# Patient Record
Sex: FEMALE | Race: WHITE | NOT HISPANIC OR LATINO
[De-identification: names, ages, dates, MRNs, and addresses within clinical notes are randomized per-mention and may not be internally consistent; named-entity substitution may affect disease eponyms.]

---

## 2023-05-03 PROBLEM — Z00.00 ENCOUNTER FOR PREVENTIVE HEALTH EXAMINATION: Status: ACTIVE | Noted: 2023-05-03

## 2023-05-10 ENCOUNTER — NON-APPOINTMENT (OUTPATIENT)
Age: 53
End: 2023-05-10

## 2023-05-10 ENCOUNTER — APPOINTMENT (OUTPATIENT)
Dept: OBGYN | Facility: CLINIC | Age: 53
End: 2023-05-10
Payer: COMMERCIAL

## 2023-05-10 VITALS — SYSTOLIC BLOOD PRESSURE: 157 MMHG | HEIGHT: 65 IN | DIASTOLIC BLOOD PRESSURE: 85 MMHG

## 2023-05-10 DIAGNOSIS — Z11.3 ENCOUNTER FOR SCREENING FOR INFECTIONS WITH A PREDOMINANTLY SEXUAL MODE OF TRANSMISSION: ICD-10-CM

## 2023-05-10 DIAGNOSIS — Z12.39 ENCOUNTER FOR OTHER SCREENING FOR MALIGNANT NEOPLASM OF BREAST: ICD-10-CM

## 2023-05-10 DIAGNOSIS — Z01.419 ENCOUNTER FOR GYNECOLOGICAL EXAMINATION (GENERAL) (ROUTINE) W/OUT ABNORMAL FINDINGS: ICD-10-CM

## 2023-05-10 DIAGNOSIS — Z11.51 ENCOUNTER FOR SCREENING FOR HUMAN PAPILLOMAVIRUS (HPV): ICD-10-CM

## 2023-05-10 DIAGNOSIS — Z13.820 ENCOUNTER FOR SCREENING FOR OSTEOPOROSIS: ICD-10-CM

## 2023-05-10 PROCEDURE — 99396 PREV VISIT EST AGE 40-64: CPT

## 2023-05-10 NOTE — REVIEW OF SYSTEMS
[Patient Intake Form Reviewed] : Patient intake form was reviewed [Negative] : Endocrine [de-identified] : History of DVT on Eliquis

## 2023-05-10 NOTE — PLAN
[FreeTextEntry1] : Pap smear completed patient with history of abdominal pelvic pain will have transvaginal sonogram and I have given her referral to Dr. Zen Tapia, GI doctor for evaluation of chronic mid abdominal pain.  She will continue vitamin D3 she will be sent for bone density study.  Patient will return in 12 months for her history of DVT and obesity patient has been advised and discussed weight loss options.  Turn in 12 months

## 2023-05-10 NOTE — HISTORY OF PRESENT ILLNESS
[HIV test declined] : HIV test declined [Syphilis test declined] : Syphilis test declined [Gonorrhea test declined] : Gonorrhea test declined [Chlamydia test declined] : Chlamydia test declined [Trichomonas test declined] : Trichomonas test declined [HPV test declined] : HPV test declined [Hepatitis B test declined] : Hepatitis B test declined [Hepatitis C test declined] : Hepatitis C test declined [postmenopausal] : postmenopausal [N] : Patient does not use contraception [Y] : Positive pregnancy history [N/A] : pregnancy not applicable [Currently Active] : currently active [Men] : men [Vaginal] : vaginal [No] : No [TextBox_4] : PT HERE FOR ANNUAL EXAM\par LMP:5/2020 [Mammogramdate] : 5/4/2023 [TextBox_19] : BR1 [PapSmeardate] : 10/26/21 [TextBox_31] : WNL [BoneDensityDate] : NEVER [ColonoscopyDate] : NEVER [LMPDate] : 5/2020 [PGHxTotal] : 2 [[facility] : [facility] [[facility]Living] : 2 [TextBox_6] : 5/2020 [FreeTextEntry1] : 5/2020

## 2023-05-10 NOTE — PHYSICAL EXAM
[Chaperone Present] : A chaperone was present in the examining room during all aspects of the physical examination [Appropriately responsive] : appropriately responsive [Alert] : alert [No Acute Distress] : no acute distress [No Lymphadenopathy] : no lymphadenopathy [Regular Rate Rhythm] : regular rate rhythm [No Murmurs] : no murmurs [Clear to Auscultation B/L] : clear to auscultation bilaterally [Soft] : soft [Non-tender] : non-tender [Non-distended] : non-distended [No HSM] : No HSM [No Lesions] : no lesions [No Mass] : no mass [Oriented x3] : oriented x3 [Examination Of The Breasts] : a normal appearance [No Masses] : no breast masses were palpable [Labia Majora] : normal [Labia Minora] : normal [Dry Mucosa] : dry mucosa [No Bleeding] : There was no active vaginal bleeding [Normal] : normal [Anteversion] : anteverted [Uterine Adnexae] : normal [Declined] : Patient declined rectal exam [FreeTextEntry1] : SM [FreeTextEntry8] : No masses nontender bilateral difficult secondary to body habitus

## 2023-05-12 LAB
C TRACH RRNA SPEC QL NAA+PROBE: NOT DETECTED
HPV HIGH+LOW RISK DNA PNL CVX: NOT DETECTED
N GONORRHOEA RRNA SPEC QL NAA+PROBE: NOT DETECTED
SOURCE TP AMPLIFICATION: NORMAL

## 2023-05-15 LAB — CYTOLOGY CVX/VAG DOC THIN PREP: NORMAL

## 2023-05-31 ENCOUNTER — APPOINTMENT (OUTPATIENT)
Dept: OBGYN | Facility: CLINIC | Age: 53
End: 2023-05-31
Payer: COMMERCIAL

## 2023-05-31 ENCOUNTER — ASOB RESULT (OUTPATIENT)
Age: 53
End: 2023-05-31

## 2023-05-31 DIAGNOSIS — G89.29 PELVIC AND PERINEAL PAIN: ICD-10-CM

## 2023-05-31 DIAGNOSIS — R10.2 PELVIC AND PERINEAL PAIN: ICD-10-CM

## 2023-05-31 PROCEDURE — 76830 TRANSVAGINAL US NON-OB: CPT

## 2023-05-31 PROCEDURE — 93976 VASCULAR STUDY: CPT

## 2023-05-31 PROCEDURE — 99213 OFFICE O/P EST LOW 20 MIN: CPT

## 2023-05-31 NOTE — HISTORY OF PRESENT ILLNESS
[FreeTextEntry1] : 51 y/o female here for consult to review tvs.  Patient with pelvic discomfort on physical examination transvaginal sonogram reveals normal pelvis small anechoic right ovarian cyst approximately 1 cm with normal Doppler studies with benign appearance..  Patient now states that pain has resolved she will be followed up with GI for further evaluation and cardiology.  Patient will return in 1 year to repeat TVS and annual exam [TextBox_4] : CONSULT TO REVEIW TVS\par LMP:2020

## 2023-06-01 ENCOUNTER — APPOINTMENT (OUTPATIENT)
Dept: ORTHOPEDIC SURGERY | Facility: CLINIC | Age: 53
End: 2023-06-01
Payer: COMMERCIAL

## 2023-06-01 VITALS — WEIGHT: 293 LBS | HEIGHT: 66 IN | BODY MASS INDEX: 47.09 KG/M2

## 2023-06-01 DIAGNOSIS — M75.02 ADHESIVE CAPSULITIS OF LEFT SHOULDER: ICD-10-CM

## 2023-06-01 DIAGNOSIS — M54.12 RADICULOPATHY, CERVICAL REGION: ICD-10-CM

## 2023-06-01 DIAGNOSIS — M75.82 OTHER SHOULDER LESIONS, LEFT SHOULDER: ICD-10-CM

## 2023-06-01 PROCEDURE — 99204 OFFICE O/P NEW MOD 45 MIN: CPT

## 2023-06-01 PROCEDURE — 73030 X-RAY EXAM OF SHOULDER: CPT | Mod: LT

## 2023-06-01 PROCEDURE — 73010 X-RAY EXAM OF SHOULDER BLADE: CPT | Mod: LT

## 2023-06-01 PROCEDURE — 72040 X-RAY EXAM NECK SPINE 2-3 VW: CPT

## 2023-06-02 PROBLEM — M75.82 TENDINITIS OF LEFT ROTATOR CUFF: Status: ACTIVE | Noted: 2023-06-01

## 2023-06-02 NOTE — HISTORY OF PRESENT ILLNESS
[de-identified] : The patient is a 52 year  old L hand dominant female who presents today complaining of L shoulder pain.  \par Date of Injury/Onset: ~04/23\par Pain:    At Rest: 0/10 \par With Activity:  10/10 \par Mechanism of injury: Insidious\par This is not a Work Related Injury being treated under Worker's Compensation.\par This is not an athletic injury occurring associated with an interscholastic or organized sports team.\par Quality of symptoms: radiating, aching, weakness\par Improves with: ice, tylenol \par Worse with: laying at night\par Prior treatment: ice, tylenol\par Prior Imaging: N/A\par Out of work/sport: _, since _\par School/Sport/Position/Occupation: \par Additional Information: None\par

## 2023-06-02 NOTE — IMAGING
[Left] : left shoulder [There are no fractures, subluxations or dislocations. No significant abnormalities are seen] : There are no fractures, subluxations or dislocations. No significant abnormalities are seen [de-identified] : The patient is a well appearing 52 year  old female of their stated age. \par Neck is supple & nontender to palpation. Negative Spurling's test. \par \par Effected Shoulder: LEFT\par Inspection: \par Scapula Winging: Negative \par Deformity: None \par Erythema: None \par Ecchymosis: None \par Abrasions: None \par Effusion: None \par \par Range of Motion:\par Active Forward Flexion: 140 degrees  \par Active Abduction: 140 degrees  \par Passive Forward Flexion: 140 degrees  \par Passive Abduction: 140 degrees  \par ER @ 90 degrees: 75 degrees \par IR @ 90 degrees: 0 degrees \par ER @ 0 degrees: 20 degrees \par Motor Exam: \par Forward Flexion: 4+ out of 5 \par Flexion Plane of Scapula: 4+ out of 5 \par Abduction: 4 out of 5 \par Internal Rotation: 5 out of 5 \par External Rotation: 4 out of 5 \par Distal Motor Strength: 5 out of 5 \par \par Stability Testing: \par Anterior: 1+ \par Posterior: 1+ \par Sulcus N: 1+ \par Sulcus ER: 1+ \par Provocative Tests: \par Drop Arm: Negative \par Impingement: POSITIVE \par [place]: Negative \par X-Arm Adduction: Negative \par Belly Press: Negative \par Bear Hug: Negative \par Lift Off: Negative \par Apprehension: Negative \par Relocation: Negative \par Posterior Load & Shift: Negative \par \par Palpation: \par AC Joint: Nontender \par Clavicle: Nontender \par SC Joint: Nontender \par Bicepital Groove: Nontender \par Coracoid Process: Nontender \par Pectoralis Minor Tendon: Nontender \par Pectoralis Major Tendon: Nontender & palpably intact \par Latissimus Dorsi: Nontender  \par Proximal Humerus: Nontender \par Scapula Body: Nontender \par Medial Scapula Boarder: Nontender \par Scapula Spine: Nontender \par \par Neurologic Exam: Sensation to Light Touch: \par Axillary: Grossly intact \par Ulnar: Grossly intact \par Radial: Grossly intact \par Median: Grossly intact \par Other:  N/A \par Circulatory/Pulses: \par Ulnar: 2+ \par Radial: 2+ \par Other Pertinent Findings: None \par \par Contralateral Shoulder \par Range of Motion: \par Active Forward Flexion: 180 degrees  \par Active Abduction: 180 degrees  \par Passive Forward Flexion: 180 degrees \par Passive Abduction: 180 degrees  \par ER @ 90 degrees: 90 degrees \par IR @ 90 degrees: 45 degrees \par ER @ 0 degrees: 50 degrees \par Motor Exam: \par Forward Flexion: 5 out of 5 \par Flexion Plane of Scapula: 5 out of 5 \par Abduction: 5 out of 5 \par Internal Rotation: 5 out of 5 \par External Rotation: 5 out of 5 \par Distal Motor Strength: 5 out of 5 \par Stability Testing: \par Anterior: 1+ \par Posterior: 1+ \par Sulcus N: 1+ \par Sulcus ER: 1+ \par Other Pertinent Findings: None \par \par Assessment: The patient is a 52 year old female with left shoulder pain and radiographic and physical exam findings consistent with adhesive capsulitis and rotator cuff tendonitis.   \par The patient’s condition is acute \par Documents/Results Reviewed Today: X-Ray left shoulder/scapula and X-Ray cervical spine \par Tests/Studies Independently Interpreted Today: X-Ray left shoulder/scapula reveals type 2-3 acromion and small inferior humeral head spur. X-Ray cervical spine reveals reversal of normal lordotic curve. \par Pertinent findings include:  140/140/75/0/20, +impingement, 4/5 ABD & ER, 4+/5 FSP & FF, 5/5 IR\par Confounding medical conditions/concerns: None\par \par Plan: Patient will start physical therapy, HEP, and stretching. The patient has been prescribed a MDP to alleviate inflammation. Modify activity as discussed.\par Tests Ordered: None \par Prescription Medications Ordered: MDP\par Braces/DME Ordered: None \par Activity/Work/Sports Status: None \par Additional Instructions: None\par Follow-Up: 6 weeks \par  \par The patient's current medication management of their orthopedic diagnosis was reviewed today:\par (1) We discussed a comprehensive treatment plan that included possible pharmaceutical management involving the use of prescription strength medications including but not limited to options such as oral Naprosyn 500mg BID, once daily Meloxicam 15 mg, or 500-650 mg Tylenol versus over the counter oral medications and topical prescription NSAID Pennsaid vs over the counter Voltaren gel.  Based on our extensive discussion, the patient was prescribed a Medrol Dose Pack to be taken as directed for the next five days.  Following which OTC NSAIDs may be used PRN for pain, inflammation, and discomfort.  No NSAID medications should be taken concurrently with the Medrol Dose Pack.\par (2) There is a moderate risk of morbidity with further treatment, especially from use of prescription strength medications and possible side effects of these medications which include upset stomach with oral medications, skin reactions to topical medications and cardiac/renal issues with long term use.\par (3) I recommended that the patient follow-up with their medical physician to discuss any significant specific potential issues with long term medication use such as interactions with current medications or with exacerbation of underlying medical comorbidities.\par (4) The benefits and risks associated with use of injectable, oral or topical, prescription and over the counter anti-inflammatory medications were discussed with the patient. The patient voiced understanding of the risks including but not limited to bleeding, stroke, kidney dysfunction, heart disease, and were referred to the black box warning label for further information. \par \par Alisa DAHL attest that this documentation has been prepared under the direction and in the presence of Provider Dr. Jose Guzman.\par \par The documentation recorded by the scribe accurately reflects the services I, Dr. Jose Guzman, personally performed and the decisions made by me.\par  [FreeTextEntry1] : X-Ray left shoulder/scapula reveals type 2-3 acromion and small inferior humeral head spur.

## 2023-07-17 ENCOUNTER — NON-APPOINTMENT (OUTPATIENT)
Age: 53
End: 2023-07-17

## 2023-07-17 DIAGNOSIS — R06.09 OTHER FORMS OF DYSPNEA: ICD-10-CM

## 2023-07-17 DIAGNOSIS — Z87.898 PERSONAL HISTORY OF OTHER SPECIFIED CONDITIONS: ICD-10-CM

## 2023-07-17 RX ORDER — APIXABAN 2.5 MG/1
2.5 TABLET, FILM COATED ORAL
Refills: 0 | Status: ACTIVE | COMMUNITY

## 2023-07-20 ENCOUNTER — APPOINTMENT (OUTPATIENT)
Dept: ORTHOPEDIC SURGERY | Facility: CLINIC | Age: 53
End: 2023-07-20

## 2023-08-10 ENCOUNTER — APPOINTMENT (OUTPATIENT)
Dept: CARDIOLOGY | Facility: CLINIC | Age: 53
End: 2023-08-10

## 2023-08-22 ENCOUNTER — APPOINTMENT (OUTPATIENT)
Dept: CARDIOLOGY | Facility: CLINIC | Age: 53
End: 2023-08-22
Payer: COMMERCIAL

## 2023-08-22 VITALS — SYSTOLIC BLOOD PRESSURE: 118 MMHG | DIASTOLIC BLOOD PRESSURE: 70 MMHG

## 2023-08-22 VITALS
BODY MASS INDEX: 47.09 KG/M2 | HEART RATE: 64 BPM | SYSTOLIC BLOOD PRESSURE: 114 MMHG | HEIGHT: 66 IN | DIASTOLIC BLOOD PRESSURE: 70 MMHG | WEIGHT: 293 LBS | OXYGEN SATURATION: 99 %

## 2023-08-22 DIAGNOSIS — R07.9 CHEST PAIN, UNSPECIFIED: ICD-10-CM

## 2023-08-22 DIAGNOSIS — I25.10 ATHEROSCLEROTIC HEART DISEASE OF NATIVE CORONARY ARTERY W/OUT ANGINA PECTORIS: ICD-10-CM

## 2023-08-22 PROCEDURE — 99214 OFFICE O/P EST MOD 30 MIN: CPT

## 2023-08-22 RX ORDER — OMEPRAZOLE 40 MG/1
40 CAPSULE, DELAYED RELEASE ORAL
Refills: 0 | Status: DISCONTINUED | COMMUNITY
End: 2023-08-22

## 2023-08-22 RX ORDER — METHYLPREDNISOLONE 4 MG/1
4 TABLET ORAL
Qty: 1 | Refills: 0 | Status: DISCONTINUED | COMMUNITY
Start: 2023-06-01 | End: 2023-08-22

## 2023-08-22 NOTE — HISTORY OF PRESENT ILLNESS
[FreeTextEntry1] : The patient is a 53-year-old white female with a past medical history remarkable for nonobstructive coronary artery disease, morbid obesity, and a family history of premature coronary artery disease with a complaint of chest pain. Cardiac catheterization on 6/23/2023 demonstrated nonobstructive disease.  The patient was experiencing 2 types of chest pain.  One was palpable and reproducible and the second clinically appeared to be secondary to gastroesophageal reflux disease.  A 30-day empiric trial of omeprazole was prescribed.  The patient reports that her chest discomfort has resolved. After the patient's cardiac catheterization, an episode of atrial fibrillation was reported.  7 days of outpatient telemetry were performed and demonstrated sinus rhythm. Due to the patient's hypercholesterolemia, atorvastatin 40 mg was prescribed. The patient's cholesterol from August 17 is at target.

## 2023-08-22 NOTE — DISCUSSION/SUMMARY
[FreeTextEntry1] : 1 chest pain Resolved.  Not secondary to a cardiomyopathy, valvular abnormality, or myocardial ischemia in view of the patient's echocardiographic and cardiac catheterization results. 2 hypercholesterolemia Controlled with atorvastatin 40 mg.  Recheck in 1 year 3 atrial fibrillation 1 episode reported in the recovery room after cardiac catheterization. 7 days of outpatient telemetry demonstrated sinus rhythm. Wean metoprolol to off over 2 weeks 4 morbid obesity Ms. CHANG MCDANIEL was instructed to follow a prudent, low-cholesterol and sodium diet. Ms. CHANG MCDANIEL was instructed to initiate an aerobic exercise program. The importance of weight loss was discussed with CHANG MCDANIEL .  Return to this office in 1 year for reevaluation

## 2023-08-22 NOTE — CARDIOLOGY SUMMARY
[de-identified] : Active Problems - Factor 5 leiden deficiency/DVT - family history of premature CAD: Mother MI 37 - morbid obesity: BMI 51 - abnormal ECG - ECHOCARDIOGRAM: 5/25/2023, EF 65%, trace tricuspid regurgitation RVSP 33 - CAD: nonobstructive (PET NUKE: 06/08/2023, [facility], distal septal and apical ischemia / infarction, basal lateral ischemia, EF 73%) CARDIAC CATH: 6/23/2023, GSH, nonobstructive disease - EVENT MONITOR: 7/6/2023, 7 day MCT worn 3 days, NSR 61 (), no AF

## 2023-08-23 RX ORDER — ATORVASTATIN CALCIUM 40 MG/1
40 TABLET, FILM COATED ORAL
Qty: 90 | Refills: 3 | Status: ACTIVE | COMMUNITY
Start: 1900-01-01 | End: 1900-01-01

## 2023-08-23 RX ORDER — METOPROLOL SUCCINATE 25 MG/1
25 TABLET, EXTENDED RELEASE ORAL DAILY
Qty: 14 | Refills: 0 | Status: ACTIVE | COMMUNITY
Start: 1900-01-01 | End: 1900-01-01

## 2024-03-18 ENCOUNTER — APPOINTMENT (OUTPATIENT)
Dept: GASTROENTEROLOGY | Facility: CLINIC | Age: 54
End: 2024-03-18
Payer: COMMERCIAL

## 2024-03-18 VITALS
WEIGHT: 293 LBS | HEIGHT: 66 IN | DIASTOLIC BLOOD PRESSURE: 90 MMHG | TEMPERATURE: 96.9 F | HEART RATE: 79 BPM | BODY MASS INDEX: 47.09 KG/M2 | OXYGEN SATURATION: 99 % | SYSTOLIC BLOOD PRESSURE: 134 MMHG

## 2024-03-18 DIAGNOSIS — D68.51 ACTIVATED PROTEIN C RESISTANCE: ICD-10-CM

## 2024-03-18 DIAGNOSIS — Z82.49 FAMILY HISTORY OF ISCHEMIC HEART DISEASE AND OTHER DISEASES OF THE CIRCULATORY SYSTEM: ICD-10-CM

## 2024-03-18 DIAGNOSIS — I82.409 ACUTE EMBOLISM AND THROMBOSIS OF UNSPECIFIED DEEP VEINS OF UNSPECIFIED LOWER EXTREMITY: ICD-10-CM

## 2024-03-18 DIAGNOSIS — I48.91 UNSPECIFIED ATRIAL FIBRILLATION: ICD-10-CM

## 2024-03-18 DIAGNOSIS — E66.01 MORBID (SEVERE) OBESITY DUE TO EXCESS CALORIES: ICD-10-CM

## 2024-03-18 DIAGNOSIS — Z92.89 PERSONAL HISTORY OF OTHER MEDICAL TREATMENT: ICD-10-CM

## 2024-03-18 DIAGNOSIS — M51.26 OTHER INTERVERTEBRAL DISC DISPLACEMENT, LUMBAR REGION: ICD-10-CM

## 2024-03-18 DIAGNOSIS — D68.2 HEREDITARY DEFICIENCY OF OTHER CLOTTING FACTORS: ICD-10-CM

## 2024-03-18 DIAGNOSIS — M25.512 PAIN IN LEFT SHOULDER: ICD-10-CM

## 2024-03-18 DIAGNOSIS — R11.10 VOMITING, UNSPECIFIED: ICD-10-CM

## 2024-03-18 DIAGNOSIS — Z78.9 OTHER SPECIFIED HEALTH STATUS: ICD-10-CM

## 2024-03-18 DIAGNOSIS — Z98.890 OTHER SPECIFIED POSTPROCEDURAL STATES: ICD-10-CM

## 2024-03-18 DIAGNOSIS — E78.5 HYPERLIPIDEMIA, UNSPECIFIED: ICD-10-CM

## 2024-03-18 PROCEDURE — 99203 OFFICE O/P NEW LOW 30 MIN: CPT

## 2024-03-18 NOTE — REVIEW OF SYSTEMS
[Fever] : no fever [Chills] : no chills [Recent Weight Loss (___ Lbs)] : no recent weight loss [Chest Pain] : no chest pain [Palpitations] : palpitations [Cough] : no cough [SOB on Exertion] : shortness of breath during exertion [As Noted in HPI] : as noted in HPI [FreeTextEntry7] : The patient has intermittent episodes of vomiting with abdominal pain.

## 2024-03-18 NOTE — ASSESSMENT
[FreeTextEntry1] : Elizabeth is a 53-year-old female with a history of factor V deficiency as well as atrial fibrillation.  Patient complains of intermittent episodes of retching and vomiting usually after a high-fiber meal.  The trigger  seems to include all members of the cruciferous vegetable family.  It is unlikely that the patient has a sensitivity or allergy to this food, but I am more concerned that the patient may have a downstream partial obstruction or fibro adhesive disease from a previous surgery.  I feel the best test at this time would be to get an upper GI/small bowel series.  Pending that result we will decide whether an endoscopy would be helpful also to rule out pyloric narrowing.  The patient is also due for colonoscopy but is at a higher risk because of her obesity and hypercoagulable state. She would need to reach out to her hematologist. She may be a candidate for Cologuard testing.

## 2024-03-18 NOTE — PHYSICAL EXAM
[Alert] : alert [No Acute Distress] : no acute distress [Obese (BMI >= 30)] : obese (BMI >= 30) [No Respiratory Distress] : no respiratory distress [Auscultation Breath Sounds / Voice Sounds] : lungs were clear to auscultation bilaterally [Heart Rate And Rhythm] : heart rate was normal and rhythm regular [Murmurs] : no murmurs [Bowel Sounds] : normal bowel sounds [Abdomen Soft] : soft [de-identified] : There is a periumbilical scar

## 2024-03-28 ENCOUNTER — OUTPATIENT (OUTPATIENT)
Dept: OUTPATIENT SERVICES | Facility: HOSPITAL | Age: 54
LOS: 1 days | End: 2024-03-28
Payer: COMMERCIAL

## 2024-03-28 DIAGNOSIS — R11.10 VOMITING, UNSPECIFIED: ICD-10-CM

## 2024-03-28 PROCEDURE — 74240 X-RAY XM UPR GI TRC 1CNTRST: CPT | Mod: 26

## 2024-03-28 PROCEDURE — 74240 X-RAY XM UPR GI TRC 1CNTRST: CPT

## 2024-08-22 ENCOUNTER — APPOINTMENT (OUTPATIENT)
Dept: CARDIOLOGY | Facility: CLINIC | Age: 54
End: 2024-08-22
Payer: COMMERCIAL

## 2024-08-22 VITALS
OXYGEN SATURATION: 99 % | WEIGHT: 293 LBS | HEART RATE: 81 BPM | DIASTOLIC BLOOD PRESSURE: 88 MMHG | BODY MASS INDEX: 47.09 KG/M2 | SYSTOLIC BLOOD PRESSURE: 130 MMHG | HEIGHT: 66 IN

## 2024-08-22 DIAGNOSIS — I25.10 ATHEROSCLEROTIC HEART DISEASE OF NATIVE CORONARY ARTERY W/OUT ANGINA PECTORIS: ICD-10-CM

## 2024-08-22 DIAGNOSIS — E66.01 MORBID (SEVERE) OBESITY DUE TO EXCESS CALORIES: ICD-10-CM

## 2024-08-22 DIAGNOSIS — E78.5 HYPERLIPIDEMIA, UNSPECIFIED: ICD-10-CM

## 2024-08-22 DIAGNOSIS — R94.31 ABNORMAL ELECTROCARDIOGRAM [ECG] [EKG]: ICD-10-CM

## 2024-08-22 PROCEDURE — 99214 OFFICE O/P EST MOD 30 MIN: CPT | Mod: 25

## 2024-08-22 PROCEDURE — 93000 ELECTROCARDIOGRAM COMPLETE: CPT

## 2024-08-22 NOTE — CARDIOLOGY SUMMARY
[de-identified] : Normal sinus rhythm, RSR prime, poor R wave progression, PVC [de-identified] :  - Factor 5 leiden deficiency/DVT - family history of premature CAD: Mother MI 37 - morbid obesity: BMI 51 - abnormal ECG - ECHOCARDIOGRAM: 5/25/2023, EF 65%, trace tricuspid regurgitation RVSP 33 - CAD: nonobstructive (PET NUKE: 06/08/2023, SFH, distal septal and apical ischemia / infarction, basal lateral ischemia, EF 73%) CARDIAC CATH: 6/23/2023, GSH, nonobstructive disease - EVENT MONITOR: 7/6/2023, 7 day MCT worn 3 days, NSR 61 (), no AF

## 2024-08-22 NOTE — DISCUSSION/SUMMARY
[FreeTextEntry1] : Coronary artery disease Nonobstructive Continue medical therapy  hypercholesterolemia Controlled with atorvastatin 40 mg.  More recent laboratory results requested   Morbid obesity BMI 51 We rediscussed diet, exercise and weight loss  RTO 1 year Ms. CHANG MCDANIEL was instructed to follow a prudent, low-cholesterol and sodium diet. Ms. CHANG MCDANIEL was instructed to initiate an aerobic exercise program. The importance of weight loss was discussed with CHANG MCDANIEL .  Return to this office in 1 year for reevaluation [EKG obtained to assist in diagnosis and management of assessed problem(s)] : EKG obtained to assist in diagnosis and management of assessed problem(s)

## 2024-08-22 NOTE — HISTORY OF PRESENT ILLNESS
[FreeTextEntry1] : The patient is a 54-year-old white female with a past medical history remarkable for nonobstructive coronary artery disease, morbid obesity, and a family history of premature coronary artery disease. Cardiac catheterization on 6/23/2023 demonstrated nonobstructive disease.  The patient has been walking without chest pain or dyspnea.  Her most recent cholesterol is from August of last year.  It was at target.

## 2025-02-22 ENCOUNTER — NON-APPOINTMENT (OUTPATIENT)
Age: 55
End: 2025-02-22

## 2025-08-20 ENCOUNTER — APPOINTMENT (OUTPATIENT)
Dept: CARDIOLOGY | Facility: CLINIC | Age: 55
End: 2025-08-20